# Patient Record
Sex: FEMALE | Race: WHITE | NOT HISPANIC OR LATINO | Employment: UNEMPLOYED | URBAN - METROPOLITAN AREA
[De-identification: names, ages, dates, MRNs, and addresses within clinical notes are randomized per-mention and may not be internally consistent; named-entity substitution may affect disease eponyms.]

---

## 2017-05-02 ENCOUNTER — TRANSCRIBE ORDERS (OUTPATIENT)
Dept: ADMINISTRATIVE | Facility: HOSPITAL | Age: 62
End: 2017-05-02

## 2017-05-02 DIAGNOSIS — E04.1 THYROID NODULE: Primary | ICD-10-CM

## 2017-05-19 ENCOUNTER — HOSPITAL ENCOUNTER (OUTPATIENT)
Dept: RADIOLOGY | Facility: MEDICAL CENTER | Age: 62
Discharge: HOME | End: 2017-05-19
Payer: COMMERCIAL

## 2017-05-19 DIAGNOSIS — E04.1 THYROID NODULE: ICD-10-CM

## 2017-05-19 PROCEDURE — 76536 US EXAM OF HEAD AND NECK: CPT

## 2017-07-27 ENCOUNTER — OFFICE VISIT (OUTPATIENT)
Dept: URGENT CARE | Facility: MEDICAL CENTER | Age: 62
End: 2017-07-27
Payer: COMMERCIAL

## 2017-07-27 PROCEDURE — S9088 SERVICES PROVIDED IN URGENT: HCPCS

## 2017-07-27 PROCEDURE — 99213 OFFICE O/P EST LOW 20 MIN: CPT

## 2017-08-27 ENCOUNTER — OFFICE VISIT (OUTPATIENT)
Dept: URGENT CARE | Facility: MEDICAL CENTER | Age: 62
End: 2017-08-27
Payer: COMMERCIAL

## 2017-08-27 PROCEDURE — S9088 SERVICES PROVIDED IN URGENT: HCPCS

## 2017-08-27 PROCEDURE — 99213 OFFICE O/P EST LOW 20 MIN: CPT

## 2017-10-09 ENCOUNTER — OFFICE VISIT (OUTPATIENT)
Dept: URGENT CARE | Facility: MEDICAL CENTER | Age: 62
End: 2017-10-09
Payer: COMMERCIAL

## 2017-10-09 ENCOUNTER — APPOINTMENT (OUTPATIENT)
Dept: RADIOLOGY | Facility: MEDICAL CENTER | Age: 62
End: 2017-10-09
Payer: COMMERCIAL

## 2017-10-09 DIAGNOSIS — S60.222A CONTUSION OF LEFT HAND: ICD-10-CM

## 2017-10-09 PROCEDURE — 99213 OFFICE O/P EST LOW 20 MIN: CPT

## 2017-10-09 PROCEDURE — S9088 SERVICES PROVIDED IN URGENT: HCPCS

## 2017-10-09 PROCEDURE — 73130 X-RAY EXAM OF HAND: CPT

## 2017-10-12 ENCOUNTER — GENERIC CONVERSION - ENCOUNTER (OUTPATIENT)
Dept: OTHER | Facility: OTHER | Age: 62
End: 2017-10-12

## 2017-10-14 NOTE — PROGRESS NOTES
Assessment  1  Contusion of hand, left (923 20) (S60 222A)   2  Closed displaced fracture of shaft of fifth metacarpal bone of left hand, initial encounter   (815 03) (T17 909Q)    Plan  Closed displaced fracture of shaft of fifth metacarpal bone of left hand, initial encounter    · Ibuprofen 600 MG Oral Tablet; TAKE 1 TABLET 3 TIMES DAILY WITH FOOD AS  NEEDED   · *1 - [facility]  *  Status: Hold For - Scheduling  Requested  for: 70YPI4354  Care Summary provided  : Yes  Contusion of hand, left    · * XR HAND 3+ VIEW LEFT; Status:Active; Requested [facility]:[id number];     Discussion/Summary  Discussion Summary:   Ulnar gutter splint applied in officeas directed ibuprofen as directed for pain up with ortho 1-2  Medication Side Effects Reviewed: Possible side effects of new medications were reviewed with the patient/guardian today  Understands and agrees with treatment plan: The treatment plan was reviewed with the patient/guardian  The patient/guardian understands and agrees with the treatment plan   Counseling Documentation With Imm: The patient was counseled regarding diagnostic results,-- instructions for management,-- risk factor reductions,-- prognosis,-- patient and family education,-- impressions,-- risks and benefits of treatment options,-- importance of compliance with treatment  Follow Up Instructions: Follow Up with your Primary Care Provider in 1-2 days  If your symptoms worsen, go to the nearest [facility] Emergency Department  Chief Complaint  1  Hand Problem  Chief Complaint Free Text Note Form: Patient states she injured L hand while trying to prevent granddaughter from falling      History of Present Illness  HPI: 56 y/o F c/o L hand pain  Pt reports she tried to save her grand daughter from falling down the steps  Pt reprots pain is located 4th digit and radiates down her hand  Pain is made worse with hard grasping   Pt reports pins and needle sensation 4th and 5th digit into her hand  Pt reports using ice and motrin with no relief  Pt is right hand dom  Hospital Based Practices Required Assessment:   Pain Assessment   the patient states they have pain  The pain is located in the L hand  (on a scale of 0 to 10, the patient rates the pain at 9 )   Abuse And Domestic Violence Screen    Yes, the patient is safe at home  -- The patient states no one is hurting them  Depression And Suicide Screen  No, the patient has not had thoughts of hurting themself  No, the patient has not felt depressed in the past 7 days  Prefered Language is  english  Primary Language is  english  Readiness To Learn: Receptive  Barriers To Learning: none  Preferred Learning: verbal      Review of Systems  Focused-Female:   Constitutional: No fever, no chills, feels well, no tiredness, no recent weight gain or loss  ENT: no ear ache, no loss of hearing, no nosebleeds or nasal discharge, no sore throat or hoarseness-- and-- as noted in HPI  Cardiovascular: no complaints of slow or fast heart rate, no chest pain, no palpitations, no leg claudication or lower extremity edema  Respiratory: no complaints of shortness of breath, no wheezing, no dyspnea on exertion, no orthopnea or PND  Breasts: no complaints of breast pain, breast lump or nipple discharge  Gastrointestinal: no complaints of abdominal pain, no constipation, no nausea or diarrhea, no vomiting, no bloody stools  Genitourinary: no complaints of dysuria, no incontinence, no pelvic pain, no dysmenorrhea, no vaginal discharge or abnormal vaginal bleeding  Musculoskeletal: no complaints of arthralgia, no myalgia, no joint swelling or stiffness, no limb pain or swelling  Integumentary: no complaints of skin rash or lesion, no itching or dry skin, no skin wounds  Neurological: no complaints of headache, no confusion, no numbness or tingling, no dizziness or fainting  Active Problems  1   Acute upper respiratory infection (465 9) (J06 9)   2  Hashimoto's disease (245 2) (E06 3)   3  Tremor, physiological (333 1) (R25 1)    Past Medical History  1  History of Acute UTI (599 0) (N39 0)   2  History of contact dermatitis (V13 3) (Z87 2)   3  History of dysuria (V13 00) (C12 145)  Active Problems And Past Medical History Reviewed: The active problems and past medical history were reviewed and updated today  Family History  Family History Reviewed: The family history was reviewed and updated today  Social History   · Never a smoker  Social History Reviewed: The social history was reviewed and updated today  Surgical History  Surgical History Reviewed: The surgical history was reviewed and updated today  Current Meds   1  No Reported Medications Recorded  Medication List Reviewed: The medication list was reviewed and updated today  Allergies  1  No Known Drug Allergies    Vitals  Signs   Recorded: 15JOV3648 08:37AM   Temperature: 98 2 F, Temporal  Heart Rate: 88  Respiration: 18  Systolic: 212  Diastolic: 78  Weight: 383 lb   O2 Saturation: 100  Pain Scale: 9    Physical Exam    Pulmonary   Respiratory effort: No increased work of breathing or signs of respiratory distress  Auscultation of lungs: Clear to auscultation  Cardiovascular   Auscultation of heart: Normal rate and rhythm, normal S1 and S2, without murmurs  Musculoskeletal   Inspection/palpation of joints, bones, and muscles: Abnormal  -- Left hand: FROM pain with flexion/extension, TTP 4th and 5th metacarpal, with edema and ecchymosis, - snuff box tenderness, +2 caprefill, NVI        Results/Data  Diagnostic Studies Reviewed:   X-ray Review Left hand: fx of 5th metacarpal       Future Appointments    Date/Time Provider Specialty Site   10/12/2017 10:20 AM [facility] [address]     Signatures   Electronically signed by : Jonna Bumpers, [facility]; Oct  9 2017  9:26AM EST (Author)    Electronically signed by : ROLY Anthony ; Oct 13 2017  9:37AM EST                       (Co-author)

## 2017-11-06 ENCOUNTER — APPOINTMENT (OUTPATIENT)
Dept: RADIOLOGY | Facility: MEDICAL CENTER | Age: 62
End: 2017-11-06
Payer: COMMERCIAL

## 2017-11-06 ENCOUNTER — GENERIC CONVERSION - ENCOUNTER (OUTPATIENT)
Dept: OTHER | Facility: OTHER | Age: 62
End: 2017-11-06

## 2017-11-06 DIAGNOSIS — S60.222A CONTUSION OF LEFT HAND: ICD-10-CM

## 2017-11-06 PROCEDURE — 73130 X-RAY EXAM OF HAND: CPT

## 2017-11-20 ENCOUNTER — APPOINTMENT (OUTPATIENT)
Dept: RADIOLOGY | Facility: MEDICAL CENTER | Age: 62
End: 2017-11-20
Payer: COMMERCIAL

## 2017-11-20 ENCOUNTER — GENERIC CONVERSION - ENCOUNTER (OUTPATIENT)
Dept: OTHER | Facility: OTHER | Age: 62
End: 2017-11-20

## 2017-11-20 DIAGNOSIS — S62.307G: ICD-10-CM

## 2017-11-20 DIAGNOSIS — S62.327A: ICD-10-CM

## 2017-11-20 PROCEDURE — 73130 X-RAY EXAM OF HAND: CPT

## 2017-12-01 ENCOUNTER — HOSPITAL ENCOUNTER (OUTPATIENT)
Dept: RADIOLOGY | Facility: HOSPITAL | Age: 62
Discharge: HOME | End: 2017-12-01
Attending: ORTHOPAEDIC SURGERY
Payer: COMMERCIAL

## 2017-12-01 ENCOUNTER — ALLSCRIPTS OFFICE VISIT (OUTPATIENT)
Dept: OTHER | Facility: OTHER | Age: 62
End: 2017-12-01

## 2017-12-01 DIAGNOSIS — S62.327A: ICD-10-CM

## 2017-12-01 PROCEDURE — 73130 X-RAY EXAM OF HAND: CPT

## 2017-12-05 NOTE — PROGRESS NOTES
Assessment    1  Closed displaced fracture of shaft of fifth metacarpal bone of left hand, initial encounter (815 03) (U72 978A)    Plan  Closed displaced fracture of shaft of fifth metacarpal bone of left hand, initial encounter    · * XR HAND 3+ VIEW LEFT; Status:Active - Retrospective By Protocol Authorization; Requested for:[id number];    · Follow-up visit in 6 weeks Evaluation and Treatment  Follow-up  Status: Hold For -Scheduling  Requested for: 45MEY6486   · Continue with our present treatment plan ; Status:Complete;   Done: 15DIJ1654   · *1 - [facility] Ref[facility]-Management  *wrist, hand, and finger ROM  Status:Active  Requested for: 75XCY4331  Care Summary provided  : Yes    Discussion/Summary    Assessment: healed fx of 5th metacarpal bone of left hand  therapy to work on wrist, hand and finger ROMcan stop use of the splint, no heavy lifting  of calcium a daywk f/u for another xray  note was dictated with dragon dictation software  As such, and may contain typographical errors, improperly dictated words, background noise, or other errors  patient presents today as a referral from [facility] for a close displaced fracture of fifth metacarpal bone of L hand with delayed healing  Patient injured this hand on Oct 9, 2017 (8 weeks ago)  Pt is no longer in pain  Pt was in a fiberglass cast for 8 weeks and then was moved into a removable splint  Pt continues to still have stiffness over the volar aspect of the base of the 5th metacarpal   past medical history, surgical history, family history, social history, medications, allergies, and review of systems have been read and reviewed on the chart and have been updated  Review of Systems was recorded in the office today on a separate evaluation sheet and is listed below    of Systems:NegativeNegativeNegativeNegativeNegativeNegativeNegative except as aboveAs aboveNegative except as aboveNegativeNegative    / Psych: Awake and Oriented, No acute distress, age appropriateNormocephalic, atraumatic, mucous membranes moist, neck supple, trachea midline  No rebound or signs of guardingNo discernible arrhythmia, 2+ pulses with good capillary refillNo audible wheezing or audible stridor[The patient is neurovascularly intact in the median, ulnar, and radial nerve distribution  There is normal sensation and good capillary refill within the digits  2+ pulses  ][No lesions, rashes, streaking, purulence, abscesses, lymphangitis]mild swelling noted over the volar side of her fifth metacarpal  L wrist extension 20 degrees, L wrist flexion 20, composite fist 2cm of flexor crease  Studies: repeat Xrays were done today in the office  They were reviewed by Dr Lucillie Cabot and discussed with the patient  Xray reveals       Chief Complaint  1  Hand Problem    Active Problems    1  Acute upper respiratory infection (465 9) (J06 9)   2  Closed displaced fracture of fifth metacarpal bone of left hand with delayed healing, unspecified portion of metacarpal, subsequent encounter (V54 19) (S62 307G)   3  Closed displaced fracture of shaft of fifth metacarpal bone of left hand, initial encounter (815 03) (S62 327A)   4  Contusion of hand, left (923 20) (S60 222A)   5  Hashimoto's disease (245 2) (E06 3)   6  Tremor, physiological (333 1) (R25 1)    Past Medical History   · History of Acute UTI (599 0) (N39 0)   · History of contact dermatitis (V13 3) (Z87 2)   · History of dysuria (V13 00) (E90 307)    Family History   · Family history of malignant neoplasm (V16 9) (Z80 9)    Social History     · Never a smoker    Current Meds   1  Ibuprofen 600 MG Oral Tablet; TAKE 1 TABLET 3 TIMES DAILY WITH FOOD AS NEEDED; Therapy: 52PHL2051 to (Evaluate:16Oct2017)  Requested for: 49MTG2639; Last Rx:09Oct2017 Ordered    Allergies  1   No Known Drug Allergies    Vitals  Signs     Heart Rate: 791  Systolic: 507  Diastolic: 85  Height: 5 ft 8 in  Weight: 185 lb 4 oz  BMI Calculated: 28 17  BSA Calculated: 1 98    Signatures   Electronically signed by : OFELIA Hopkins ; Dec  1 2017  2:13PM EST                       (Author)

## 2018-01-22 VITALS
BODY MASS INDEX: 28.19 KG/M2 | HEART RATE: 79 BPM | SYSTOLIC BLOOD PRESSURE: 159 MMHG | HEIGHT: 68 IN | WEIGHT: 186 LBS | DIASTOLIC BLOOD PRESSURE: 88 MMHG

## 2018-01-22 VITALS
BODY MASS INDEX: 28.08 KG/M2 | WEIGHT: 185.25 LBS | HEART RATE: 103 BPM | HEIGHT: 68 IN | DIASTOLIC BLOOD PRESSURE: 85 MMHG | SYSTOLIC BLOOD PRESSURE: 168 MMHG

## 2018-01-22 VITALS
DIASTOLIC BLOOD PRESSURE: 82 MMHG | SYSTOLIC BLOOD PRESSURE: 129 MMHG | HEIGHT: 68 IN | BODY MASS INDEX: 28.19 KG/M2 | WEIGHT: 186 LBS | HEART RATE: 85 BPM

## 2018-01-22 VITALS
BODY MASS INDEX: 28.19 KG/M2 | WEIGHT: 186 LBS | HEART RATE: 80 BPM | SYSTOLIC BLOOD PRESSURE: 138 MMHG | HEIGHT: 68 IN | DIASTOLIC BLOOD PRESSURE: 83 MMHG

## 2018-01-23 NOTE — CONSULTS
Chief Complaint    1  Hand Problem    Active Problems    1  Acute upper respiratory infection (465 9) (J06 9)   2  Closed displaced fracture of fifth metacarpal bone of left hand with delayed healing,   unspecified portion of metacarpal, subsequent encounter (V54 19) (S62 307G)   3  Closed displaced fracture of shaft of fifth metacarpal bone of left hand, initial encounter   (815 03) (S62 327A)   4  Contusion of hand, left (923 20) (S60 222A)   5  Hashimoto's disease (245 2) (E06 3)   6  Tremor, physiological (333 1) (R25 1)    Past Medical History    1  History of Acute UTI (599 0) (N39 0)   2  History of contact dermatitis (V13 3) (Z87 2)   3  History of dysuria (V13 00) (D78 664)    Family History    1  Family history of malignant neoplasm (V16 9) (Z80 9)    Social History    · Never a smoker    Current Meds   1  Ibuprofen 600 MG Oral Tablet; TAKE 1 TABLET 3 TIMES DAILY WITH FOOD AS NEEDED; Therapy: 23EPF4727 to (Evaluate:16Oct2017)  Requested for: 12DJR5040; Last   Rx:09Oct2017 Ordered    Allergies    1  No Known Drug Allergies    Vitals  Signs   Recorded: 67TEF6597 12:11PM   Heart Rate: 360  Systolic: 256  Diastolic: 85  Height: 5 ft 8 in  Weight: 185 lb 4 oz  BMI Calculated: 28 17  BSA Calculated: 1 98    Assessment    1  Closed displaced fracture of shaft of fifth metacarpal bone of left hand, initial encounter   (815 03) (Z16 529X)    Plan  Closed displaced fracture of shaft of fifth metacarpal bone of left hand, initial encounter    1  * XR HAND 3+ VIEW LEFT; Status:Active - Retrospective By Protocol Authorization; Requested for:[id number];    2  Follow-up visit in 6 weeks Evaluation and Treatment  Follow-up  Status: Hold For -   Scheduling  Requested for: 42PKY4196   3  Continue with our present treatment plan ; Status:Complete;   Done: 36AWT0973   4  *1 - [facility] Ref[facility] Co-Management  *wrist, hand, and finger ROM  Status:   Active  Requested for: 06HGF3470  Care Summary provided   Jimi Beckford Yes    Discussion/Summary  Discussion Summary:   Assessment: healed fx of 5th metacarpal bone of left hand  Plan:   Physical therapy to work on wrist, hand and finger ROM  pt can stop use of the splint, no heavy lifting  2000mg of calcium a day  6 wk f/u for another xray  This note was dictated with dragon dictation software  As such, and may contain typographical errors, improperly dictated words, background noise, or other errors  HPI:  The patient presents today as a referral from [facility] for a close displaced fracture of fifth metacarpal bone of L hand with delayed healing  Patient injured this hand on Oct 9, 2017 (8 weeks ago)  Pt is no longer in pain  Pt was in a fiberglass cast for 8 weeks and then was moved into a removable splint  Pt continues to still have stiffness over the volar aspect of the base of the 5th metacarpal      The past medical history, surgical history, family history, social history, medications, allergies, and review of systems have been read and reviewed on the chart and have been updated  Review of Systems was recorded in the office today on a separate evaluation sheet and is listed below  Review of Systems:  Constitutional: Negative  HEENT: Negative  Cardiovascular: Negative  Pulmonary: Negative  : Negative  GI: Negative  Skin: Negative except as above  Musculoskeletal: As above  Neurologic: Negative except as above  Endocrine: Negative  Psychiatric: Negative    Examination:    General / Psych: Awake and Oriented, No acute distress, age appropriate  HEENT: Normocephalic, atraumatic, mucous membranes moist, neck supple, trachea midline  Abdomen: No rebound or signs of guarding  Cardio: No discernible arrhythmia, 2+ pulses with good capillary refill  Pulmonary: No audible wheezing or audible stridor  Neurologic: [The patient is neurovascularly intact in the median, ulnar, and radial nerve distribution  There is normal sensation and good capillary refill within the digits  2+ pulses ]  Skin: [No lesions, rashes, streaking, purulence, abscesses, lymphangitis]  Musculoskeletal: mild swelling noted over the volar side of her fifth metacarpal  L wrist extension 20 degrees, L wrist flexion 20, composite fist 2cm of flexor crease  Diagnostic Studies: repeat Xrays were done today in the office  They were reviewed by Dr Lelia Albert and discussed with the patient   Xray reveals          Signatures   Electronically signed by : OFELIA Hopkins ; Dec  1 2017  2:13PM EST                       (Author)

## 2018-01-23 NOTE — CONSULTS
Chief Complaint    1  Hand Problem    Active Problems    1  Acute upper respiratory infection (465 9) (J06 9)   2  Closed displaced fracture of fifth metacarpal bone of left hand with delayed healing,   unspecified portion of metacarpal, subsequent encounter (V54 19) (S62 307G)   3  Closed displaced fracture of shaft of fifth metacarpal bone of left hand, initial encounter   (815 03) (S62 327A)   4  Contusion of hand, left (923 20) (S60 222A)   5  Hashimoto's disease (245 2) (E06 3)   6  Tremor, physiological (333 1) (R25 1)    Past Medical History    · History of Acute UTI (599 0) (N39 0)   · History of contact dermatitis (V13 3) (Z87 2)   · History of dysuria (V13 00) (Q70 739)    Family History    · Family history of malignant neoplasm (V16 9) (Z80 9)    Social History    · Never a smoker    Current Meds   1  Ibuprofen 600 MG Oral Tablet; TAKE 1 TABLET 3 TIMES DAILY WITH FOOD AS NEEDED; Therapy: 78PMT3250 to (Evaluate:16Oct2017)  Requested for: 90AEB7457; Last   Rx:09Oct2017 Ordered    Allergies    1  No Known Drug Allergies    Vitals  Signs    Heart Rate: 959GHKAWJVR: 230PPMDBSJXZ: 37UASRMA: 5 ft 8 inWeight: 185 lb 4 ozBMI Calculated: 28 17BSA Calculated: 1 98    Assessment    1  Closed displaced fracture of shaft of fifth metacarpal bone of left hand, initial encounter   (815 03) (K68 447A)    Plan     Closed displaced fracture of shaft of fifth metacarpal bone of left hand, initial encounter    · * XR HAND 3+ VIEW LEFT; Status:Active - Retrospective By Protocol Authorization;   Requested for:17Icn2656;    · Follow-up visit in 6 weeks Evaluation and Treatment  Follow-up  Status: Hold For -  Scheduling  Requested for: 26VSS7929   · Continue with our present treatment plan ; Status:Complete;   Done: 94EHV8974   · *1 - [facility] Ref[facility]-Management  *wrist, hand, and finger ROM  Status:  Active  Requested for: 12LXZ9558  Care Summary provided  : Yes    Discussion/Summary    Assessment: healed fx of 5th metacarpal bone of left hand  Plan:   Physical therapy to work on wrist, hand and finger ROM  pt can stop use of the splint, no heavy lifting  2000mg of calcium a day  6 wk f/u for another xray  This note was dictated with dragon dictation software  As such, and may contain typographical errors, improperly dictated words, background noise, or other errors  HPI:  The patient presents today as a referral from [facility] for a close displaced fracture of fifth metacarpal bone of L hand with delayed healing  Patient injured this hand on Oct 9, 2017 (8 weeks ago)  Pt is no longer in pain  Pt was in a fiberglass cast for 8 weeks and then was moved into a removable splint  Pt continues to still have stiffness over the volar aspect of the base of the 5th metacarpal      The past medical history, surgical history, family history, social history, medications, allergies, and review of systems have been read and reviewed on the chart and have been updated  Review of Systems was recorded in the office today on a separate evaluation sheet and is listed below  Review of Systems:  Constitutional: Negative  HEENT: Negative  Cardiovascular: Negative  Pulmonary: Negative  : Negative  GI: Negative  Skin: Negative except as above  Musculoskeletal: As above  Neurologic: Negative except as above  Endocrine: Negative  Psychiatric: Negative    Examination:    General / Psych: Awake and Oriented, No acute distress, age appropriate  HEENT: Normocephalic, atraumatic, mucous membranes moist, neck supple, trachea midline  Abdomen: No rebound or signs of guarding  Cardio: No discernible arrhythmia, 2+ pulses with good capillary refill  Pulmonary: No audible wheezing or audible stridor  Neurologic: [The patient is neurovascularly intact in the median, ulnar, and radial nerve distribution  There is normal sensation and good capillary refill within the digits   2+ pulses ]  Skin: [No lesions, rashes, streaking, purulence, abscesses, lymphangitis]  Musculoskeletal: mild swelling noted over the volar side of her fifth metacarpal  L wrist extension 20 degrees, L wrist flexion 20, composite fist 2cm of flexor crease  Diagnostic Studies: repeat Xrays were done today in the office  They were reviewed by Dr Shanice Lopez and discussed with the patient   Xray reveals          Signatures   Electronically signed by : OFELIA Tijerina ; Dec  1 2017  2:13PM EST                       (Author)

## 2018-03-11 ENCOUNTER — OFFICE VISIT (OUTPATIENT)
Dept: URGENT CARE | Facility: MEDICAL CENTER | Age: 63
End: 2018-03-11
Payer: COMMERCIAL

## 2018-03-11 VITALS
BODY MASS INDEX: 28.13 KG/M2 | WEIGHT: 185 LBS | DIASTOLIC BLOOD PRESSURE: 82 MMHG | OXYGEN SATURATION: 95 % | SYSTOLIC BLOOD PRESSURE: 145 MMHG | HEART RATE: 107 BPM | RESPIRATION RATE: 18 BRPM | TEMPERATURE: 98.4 F

## 2018-03-11 DIAGNOSIS — L23.7 ALLERGIC CONTACT DERMATITIS DUE TO PLANTS, EXCEPT FOOD: ICD-10-CM

## 2018-03-11 DIAGNOSIS — R30.0 DYSURIA: Primary | ICD-10-CM

## 2018-03-11 PROCEDURE — 99213 OFFICE O/P EST LOW 20 MIN: CPT

## 2018-03-11 PROCEDURE — S9088 SERVICES PROVIDED IN URGENT: HCPCS

## 2018-03-11 PROCEDURE — 87086 URINE CULTURE/COLONY COUNT: CPT

## 2018-03-11 RX ORDER — SULFAMETHOXAZOLE AND TRIMETHOPRIM 800; 160 MG/1; MG/1
1 TABLET ORAL EVERY 12 HOURS SCHEDULED
Qty: 6 TABLET | Refills: 0 | Status: SHIPPED | OUTPATIENT
Start: 2018-03-11 | End: 2018-03-14

## 2018-03-11 RX ORDER — TRIAMCINOLONE ACETONIDE 1 MG/G
CREAM TOPICAL 2 TIMES DAILY
Qty: 30 G | Refills: 0 | Status: SHIPPED | OUTPATIENT
Start: 2018-03-11

## 2018-03-11 NOTE — PROGRESS NOTES
[email or web address]        NAME: Fili Cassidy is a 57 y o  female  : 1955    MRN: [id number]  DATE: 2018  TIME: 9:37 AM    Assessment and Plan   Dysuria [R30 0]  1  Dysuria  POCT urine dip    Urine culture   2  Allergic contact dermatitis due to plants, except food  triamcinolone (KENALOG) 0 1 % cream    sulfamethoxazole-trimethoprim (BACTRIM DS) 800-160 mg per tablet         Patient Instructions       Patient Instructions   Take antibiotic as directed  Eat yogurt to avoid GI upset  Increase fluid intake  Avoid holding bladder for prolonged periods  Urinate after intercourse  Avoid wearing a sweater  Use steroid cream as directed  Follow up with PCP in 1-2 days  Go to the ER for worsening symptoms  Dysuria   WHAT YOU NEED TO KNOW:   Dysuria is difficulty urinating, or pain, burning, or discomfort with urination  Dysuria is usually a symptom of another problem  DISCHARGE INSTRUCTIONS:   Return to the emergency department if:   · You have severe back, side, or abdominal pain  · You have fever and shaking chills  · You vomit several times in a row  Contact your healthcare provider if:   · Your symptoms do not go away, even after treatment  · You have questions or concerns about your condition or care  Medicines:   · Medicines  may be given to help treat a bacterial infection or help decrease bladder spasms  · Take your medicine as directed  Contact your healthcare provider if you think your medicine is not helping or if you have side effects  Tell him of her if you are allergic to any medicine  Keep a list of the medicines, vitamins, and herbs you take  Include the amounts, and when and why you take them  Bring the list or the pill bottles to follow-up visits  Carry your medicine list with you in case of an emergency  Follow up with your healthcare provider as directed:   Your healthcare provider may also refer you to a urologist or nephrologist to have additional testing  Write down your questions so you remember to ask them during your visits  Manage your dysuria:   · Drink more liquids  Liquids help flush out bacteria that may be causing an infection  Ask your healthcare provider how much liquid to drink each day and which liquids are best for you  · Take sitz baths as directed  Fill a bathtub with 4 to 6 inches of warm water  You may also use a sitz bath pan that fits over a toilet  Sit in the sitz bath for 20 minutes  Do this 2 to 3 times a day, or as directed  The warm water can help decrease pain and swelling  © 2017 [address]  Information is for End User's use only and may not be sold, redistributed or otherwise used for commercial purposes  All illustrations and images included in CareNotes® are the copyrighted property of A D A M [employer] Inc  or [address]  The above information is an  only  It is not intended as medical advice for individual conditions or treatments  Talk to your doctor, nurse or pharmacist before following any medical regimen to see if it is safe and effective for you  Chief Complaint     Chief Complaint   Patient presents with    Difficulty Urinating     times one day     Rash     on shoulders, thinks its sunburn         History of Present Illness       Pt reports rash on b/l shoulders  Pt reports she did get new sweaters and unsure what it is from  Pt denies any changes in soaps detergents  Pt denies any throat, tongue or lip swelling  Pt reports she did come back from a cruise on Friday  Pt reports similar rash from the last time she went on a cruise  Pt reports she was unsure if it was sunburn  Urinary Tract Infection    This is a new problem  The current episode started yesterday  The problem has been unchanged  The pain is at a severity of 6/10  The pain is moderate  There has been no fever  Associated symptoms include frequency and urgency   Pertinent negatives include no chills, discharge, flank pain, hematuria, hesitancy, nausea, possible pregnancy, sweats or vomiting  Associated symptoms comments: Abdominal pressure supra pubic region      There is no history of catheterization, kidney stones, recurrent UTIs, a single kidney, urinary stasis or a urological procedure  Review of Systems   Review of Systems   Constitutional: Negative for chills, fatigue and fever  HENT: Negative for congestion, ear pain, hearing loss, postnasal drip, sinus pain, sinus pressure and sore throat  Eyes: Negative for pain and discharge  Respiratory: Negative for chest tightness and shortness of breath  Cardiovascular: Negative for chest pain  Gastrointestinal: Negative for abdominal pain, constipation, nausea and vomiting  Genitourinary: Positive for frequency and urgency  Negative for difficulty urinating, flank pain, hematuria and hesitancy  Musculoskeletal: Negative for arthralgias and myalgias  Skin: Negative for rash  Neurological: Negative for dizziness and headaches  Psychiatric/Behavioral: Negative for behavioral problems           Current Medications       Current Outpatient Prescriptions:     sulfamethoxazole-trimethoprim (BACTRIM DS) 800-160 mg per tablet, Take 1 tablet by mouth every 12 (twelve) hours for 3 days, Disp: 6 tablet, Rfl: 0    triamcinolone (KENALOG) 0 1 % cream, Apply topically 2 (two) times a day, Disp: 30 g, Rfl: 0    Current Allergies     Allergies as of 03/11/2018 - Reviewed 03/11/2018   Allergen Reaction Noted    Bee venom Anaphylaxis 03/11/2018    Influenza vaccines Rash 03/11/2018            The following portions of the patient's history were reviewed and updated as appropriate: allergies, current medications, past family history, past medical history, past social history, past surgical history and problem list      Past Medical History:   Diagnosis Date    Known health problems: none        Past Surgical History:   Procedure Laterality Date  NO PAST SURGERIES         No family history on file  Medications have been verified  Objective   /82   Pulse (!) 107   Temp 98 4 °F (36 9 °C) (Oral)   Resp 18   Wt 83 9 kg (185 lb)   SpO2 95%   BMI 28 13 kg/m²        Physical Exam     Physical Exam   Constitutional: She is oriented to person, place, and time  She appears well-developed and well-nourished  Cardiovascular: Normal rate, regular rhythm and normal heart sounds  No murmur heard  Pulmonary/Chest: Effort normal and breath sounds normal  No respiratory distress  Abdominal: Soft  Normal appearance and bowel sounds are normal  She exhibits no distension  There is tenderness in the suprapubic area  There is no rigidity, no rebound, no guarding, no CVA tenderness, no tenderness at McBurney's point and negative Bueno's sign  Neurological: She is alert and oriented to person, place, and time  Skin: Rash noted  Rash is vesicular  B/l erythematous vesicular rash on anterior aspect of shoulders  Psychiatric: She has a normal mood and affect  Nursing note and vitals reviewed

## 2018-03-11 NOTE — PATIENT INSTRUCTIONS
Take antibiotic as directed  Eat yogurt to avoid GI upset  Increase fluid intake  Avoid holding bladder for prolonged periods  Urinate after intercourse  Avoid wearing a sweater  Use steroid cream as directed  Follow up with PCP in 1-2 days  Go to the ER for worsening symptoms  Dysuria   WHAT YOU NEED TO KNOW:   Dysuria is difficulty urinating, or pain, burning, or discomfort with urination  Dysuria is usually a symptom of another problem  DISCHARGE INSTRUCTIONS:   Return to the emergency department if:   · You have severe back, side, or abdominal pain  · You have fever and shaking chills  · You vomit several times in a row  Contact your healthcare provider if:   · Your symptoms do not go away, even after treatment  · You have questions or concerns about your condition or care  Medicines:   · Medicines  may be given to help treat a bacterial infection or help decrease bladder spasms  · Take your medicine as directed  Contact your healthcare provider if you think your medicine is not helping or if you have side effects  Tell him of her if you are allergic to any medicine  Keep a list of the medicines, vitamins, and herbs you take  Include the amounts, and when and why you take them  Bring the list or the pill bottles to follow-up visits  Carry your medicine list with you in case of an emergency  Follow up with your healthcare provider as directed: Your healthcare provider may also refer you to a urologist or nephrologist to have additional testing  Write down your questions so you remember to ask them during your visits  Manage your dysuria:   · Drink more liquids  Liquids help flush out bacteria that may be causing an infection  Ask your healthcare provider how much liquid to drink each day and which liquids are best for you  · Take sitz baths as directed  Fill a bathtub with 4 to 6 inches of warm water  You may also use a sitz bath pan that fits over a toilet   Sit in the sitz bath for 20 minutes  Do this 2 to 3 times a day, or as directed  The warm water can help decrease pain and swelling  © 2017 [address] Information is for End User's use only and may not be sold, redistributed or otherwise used for commercial purposes  All illustrations and images included in CareNotes® are the copyrighted property of [employer]  or [facility]  The above information is an  only  It is not intended as medical advice for individual conditions or treatments  Talk to your doctor, nurse or pharmacist before following any medical regimen to see if it is safe and effective for you

## 2018-03-12 LAB — BACTERIA UR CULT: NORMAL

## 2018-08-28 ENCOUNTER — OFFICE VISIT (OUTPATIENT)
Dept: URGENT CARE | Facility: MEDICAL CENTER | Age: 63
End: 2018-08-28
Payer: COMMERCIAL

## 2018-08-28 VITALS
WEIGHT: 188.6 LBS | HEART RATE: 90 BPM | SYSTOLIC BLOOD PRESSURE: 154 MMHG | TEMPERATURE: 98.9 F | HEIGHT: 68 IN | BODY MASS INDEX: 28.58 KG/M2 | OXYGEN SATURATION: 99 % | DIASTOLIC BLOOD PRESSURE: 85 MMHG

## 2018-08-28 DIAGNOSIS — L23.7 POISON IVY DERMATITIS: Primary | ICD-10-CM

## 2018-08-28 PROCEDURE — S9088 SERVICES PROVIDED IN URGENT: HCPCS

## 2018-08-28 PROCEDURE — 99203 OFFICE O/P NEW LOW 30 MIN: CPT

## 2018-08-28 RX ORDER — CEPHALEXIN 500 MG/1
500 CAPSULE ORAL EVERY 6 HOURS SCHEDULED
Status: DISCONTINUED | OUTPATIENT
Start: 2018-08-28 | End: 2018-08-28

## 2018-08-28 RX ORDER — PREDNISONE 20 MG/1
40 TABLET ORAL DAILY
Qty: 10 TABLET | Refills: 0 | Status: SHIPPED | OUTPATIENT
Start: 2018-08-28 | End: 2018-09-02

## 2018-08-28 RX ORDER — CEPHALEXIN 500 MG/1
500 CAPSULE ORAL EVERY 6 HOURS SCHEDULED
Qty: 28 CAPSULE | Refills: 0 | Status: SHIPPED | OUTPATIENT
Start: 2018-08-28 | End: 2018-09-04

## 2018-08-28 NOTE — PROGRESS NOTES
[facility]        NAME: Robson Vaughn is a 70 y o  female  : 1955    MRN: [id number]  DATE: 2018  TIME: 7:37 PM    Assessment and Plan   Poison ivy dermatitis [L23 7]  1  Poison ivy dermatitis  predniSONE 20 mg tablet    cephalexin (KEFLEX) capsule 500 mg         Patient Instructions      poison ivy dermatitis  Prednisone 40mg daily x 5 days  If redness starts spreading start keflex  Follow up with PCP in 3-5 days  Proceed to  ER if symptoms worsen  Chief Complaint     Chief Complaint   Patient presents with   [facility]     Poison ivy on right arm and legs x 2 days  History of Present Illness       Patient presents with c/o rash to upper extremities after working on the garden  States the rash is not going away and now has a little redness around the rash        Review of Systems   Review of Systems   Constitutional: Negative  Respiratory: Negative  Cardiovascular: Negative  Skin: Positive for rash           Current Medications       Current Outpatient Prescriptions:     predniSONE 20 mg tablet, Take 2 tablets (40 mg total) by mouth daily for 5 days, Disp: 10 tablet, Rfl: 0    triamcinolone (KENALOG) 0 1 % cream, Apply topically 2 (two) times a day (Patient not taking: Reported on 2018 ), Disp: 30 g, Rfl: 0    Current Facility-Administered Medications:     cephalexin (KEFLEX) capsule 500 mg, 500 mg, Oral, [address], Juan Preciado PA-C    Current Allergies     Allergies as of 2018 - Reviewed 2018   Allergen Reaction Noted    Bee venom Anaphylaxis 2018    Influenza vaccines Rash 2018            The following portions of the patient's history were reviewed and updated as appropriate: allergies, current medications, past family history, past medical history, past social history, past surgical history and problem list      Past Medical History:   Diagnosis Date    Known health problems: none        Past Surgical History:   Procedure Laterality Date    NO PAST SURGERIES         No family history on file  Medications have been verified  Objective   /85   Pulse 90   Temp 98 9 °F (37 2 °C) (Temporal)   Ht 5' 8" (1 727 m)   Wt 85 5 kg (188 lb 9 6 oz)   SpO2 99%   BMI 28 68 kg/m²        Physical Exam     Physical Exam   Constitutional: She appears well-developed and well-nourished  No distress  HENT:   Head: Normocephalic and atraumatic  Neck: Normal range of motion  Neck supple  Cardiovascular: Normal rate, regular rhythm, normal heart sounds and intact distal pulses  Pulmonary/Chest: Effort normal and breath sounds normal    Lymphadenopathy:     She has no cervical adenopathy  Skin: She is not diaphoretic

## 2018-08-28 NOTE — PATIENT INSTRUCTIONS
poison ivy dermatitis  Prednisone 40mg daily x 5 days  If redness starts spreading start keflex  Follow up with PCP in 3-5 days  Proceed to  ER if symptoms worsen  Poison Ivy   WHAT YOU NEED TO KNOW:   Poison ivy is a plant that can cause an itchy, uncomfortable rash on your skin  Poison ivy grows as a shrub or vine in woods, fields, and areas of thick [place]  It has 3 bright green leaves on each stem that turn red in essie  DISCHARGE INSTRUCTIONS:   Medicines:   · Antiseptic or drying creams or ointments: These medicines may be used to dry out the rash and decrease the itching  These products may be available without a doctor's order  · Steroids: This medicine helps decrease itching and inflammation  It can be given as a cream to apply to your skin or as a pill  · Antihistamines: This medicine may help decrease itching and help you sleep  It is available without a doctor's order  · Take your medicine as directed  Contact your healthcare provider if you think your medicine is not helping or if you have side effects  Tell him of her if you are allergic to any medicine  Keep a list of the medicines, vitamins, and herbs you take  Include the amounts, and when and why you take them  Bring the list or the pill bottles to follow-up visits  Carry your medicine list with you in case of an emergency  Follow up with your healthcare provider as directed:  Write down your questions so you remember to ask them during your visits  How your poison ivy rash spreads: You cannot spread poison ivy by touching your rash or the liquid from your blisters  Poison ivy is spread only if you scratch your skin while it still has oil on it  You may think your rash is spreading because new rashes appear over a number of days  This happens because areas covered by thin skin break out in a rash first  Your face or forearms may develop a rash before thicker areas, such as the palms of your hands     Self-care: · Keep your rash clean and dry:  Wash it with soap and water  Gently pat it dry with a clean towel  · Try not to scratch or rub your rash: This can cause your skin to become infected  · Use a compress on your rash:  Dip a clean washcloth in cool water  Wring it out and place it on your rash  Leave the washcloth on your skin for 15 minutes  Do this at least 3 times per day  · Take a cornstarch or oatmeal bath: If your rash is too large to cover with wet washcloths, take 3 or 4 cornstarch baths daily  Mix 1 pound of cornstarch with a little water to make a paste  Add the paste to a tub full of water and mix well  You may also use colloidal oatmeal in the bath water  Use lukewarm water  Avoid hot water because it may cause your itching to increase  Prevent a poison ivy rash in the future:   · Wear skin protection:  Wear long pants, a long-sleeved shirt, and gloves  Use a skin block lotion to protect your skin from poison ivy oil  You can find this at a drugstore without a prescription  · Wash clothing after possible exposure: If you think you have been near a poison ivy plant, wash the clothes you were wearing separately from other clothes  Rinse the washing machine well after you take the clothes out  Scrub boots and shoes with warm, soapy water  Dry clean items and clothing that you cannot wash in water  Poison ivy oil is sticky and can stay on surfaces for a long time  It can cause a new rash even years later  · Bathe your pet:  Use warm water and shampoo on your pet's fur  This will prevent the spread of oil to your skin, car, and home  Wear long sleeves, long pants, and gloves while washing pets or any items that may have oil on them  · Reduce exposure to poison ivy:  Do not touch plants that look like poison ivy  Keep your yard free of poison ivy  While protecting your skin, remove the plant and the roots  Place them in a plastic bag and seal the bag tightly       · Do not burn poison ivy plants: This can spread the oil through the air  If you breathe the oil into your lungs, you could have swelling and serious breathing problems  Oil that clings to the fire danae can land on your skin and cause a rash  Contact your healthcare provider if:   · You have pus, soft yellow scabs, or tenderness on the rash  · The itching gets worse or keeps you awake at night  · The rash covers more than 1/4 of your skin or spreads to your eyes, mouth, or genital area  · The rash is not better after 2 to 3 weeks  · You have tender, swollen glands on the sides of your neck  · You have swelling in your arms and legs  · You have questions or concerns about your condition or care  Return to the emergency department if:   · You have a fever  · You have redness, swelling, and tenderness around the rash  · You have trouble breathing  © 2017 [address] Information is for End User's use only and may not be sold, redistributed or otherwise used for commercial purposes  All illustrations and images included in CareNotes® are the copyrighted property of [employer]  or [employer]  The above information is an  only  It is not intended as medical advice for individual conditions or treatments  Talk to your doctor, nurse or pharmacist before following any medical regimen to see if it is safe and effective for you

## 2019-05-10 ENCOUNTER — HOSPITAL ENCOUNTER (OUTPATIENT)
Dept: RADIOLOGY | Facility: MEDICAL CENTER | Age: 64
Discharge: HOME | End: 2019-05-10
Payer: COMMERCIAL

## 2019-05-10 DIAGNOSIS — E03.9 PRIMARY HYPOTHYROIDISM: ICD-10-CM

## 2019-05-10 PROCEDURE — 76536 US EXAM OF HEAD AND NECK: CPT

## 2019-09-22 ENCOUNTER — OFFICE VISIT (OUTPATIENT)
Dept: URGENT CARE | Facility: MEDICAL CENTER | Age: 64
End: 2019-09-22
Payer: COMMERCIAL

## 2019-09-22 VITALS
RESPIRATION RATE: 18 BRPM | HEART RATE: 80 BPM | OXYGEN SATURATION: 98 % | HEIGHT: 68 IN | SYSTOLIC BLOOD PRESSURE: 148 MMHG | BODY MASS INDEX: 28.73 KG/M2 | WEIGHT: 189.6 LBS | DIASTOLIC BLOOD PRESSURE: 86 MMHG | TEMPERATURE: 99.9 F

## 2019-09-22 DIAGNOSIS — R30.0 DYSURIA: Primary | ICD-10-CM

## 2019-09-22 LAB
SL AMB  POCT GLUCOSE, UA: ABNORMAL
SL AMB LEUKOCYTE ESTERASE,UA: ABNORMAL
SL AMB POCT BILIRUBIN,UA: ABNORMAL
SL AMB POCT BLOOD,UA: ABNORMAL
SL AMB POCT CLARITY,UA: ABNORMAL
SL AMB POCT COLOR,UA: YELLOW
SL AMB POCT KETONES,UA: ABNORMAL
SL AMB POCT NITRITE,UA: ABNORMAL
SL AMB POCT PH,UA: 5
SL AMB POCT SPECIFIC GRAVITY,UA: 1.02
SL AMB POCT URINE PROTEIN: ABNORMAL
SL AMB POCT UROBILINOGEN: 0.2

## 2019-09-22 PROCEDURE — 81002 URINALYSIS NONAUTO W/O SCOPE: CPT | Performed by: PHYSICIAN ASSISTANT

## 2019-09-22 PROCEDURE — 99213 OFFICE O/P EST LOW 20 MIN: CPT | Performed by: PHYSICIAN ASSISTANT

## 2019-09-22 PROCEDURE — 87077 CULTURE AEROBIC IDENTIFY: CPT | Performed by: PHYSICIAN ASSISTANT

## 2019-09-22 PROCEDURE — S9088 SERVICES PROVIDED IN URGENT: HCPCS | Performed by: PHYSICIAN ASSISTANT

## 2019-09-22 PROCEDURE — 87186 SC STD MICRODIL/AGAR DIL: CPT | Performed by: PHYSICIAN ASSISTANT

## 2019-09-22 PROCEDURE — 87086 URINE CULTURE/COLONY COUNT: CPT | Performed by: PHYSICIAN ASSISTANT

## 2019-09-22 RX ORDER — NITROFURANTOIN 25; 75 MG/1; MG/1
100 CAPSULE ORAL 2 TIMES DAILY
Qty: 14 CAPSULE | Refills: 0 | Status: SHIPPED | OUTPATIENT
Start: 2019-09-22 | End: 2019-09-29

## 2019-09-22 NOTE — PATIENT INSTRUCTIONS
1  Increase fluids  2  Take Macrobid 100mg  Twice daily x 7 days  3 Motrin as needed for comfort  4   Follow up with PCP in 3-5 days if symptoms persist

## 2019-09-22 NOTE — PROGRESS NOTES
[facility]        NAME: Lucy Kam is a 75 y o  female  : 1955    MRN: [id number]  DATE: 2019  TIME: 10:52 AM    Assessment and Plan   Dysuria [R30 0]  1  Dysuria  POCT urine dip    Urine culture    nitrofurantoin (MACROBID) 100 mg capsule         Patient Instructions     1  Increase fluids  2  Take Macrobid 100mg  Twice daily x 7 days  3 Motrin as needed for comfort  4  Follow up with PCP in 3-5 days if symptoms persist     Chief Complaint     Chief Complaint   Patient presents with    Possible UTI     Dysuria, hematuria x 24 hours  History of Present Illness       Chapis Becker is a 24-year-old female presents with a 2 day history of increased urinary frequency, pain and burning with urination  Patient has had no fever but has reported lower abdominal discomfort and visible blood in her urine  Review of Systems   Review of Systems   Constitutional: Negative  Gastrointestinal: Positive for abdominal pain  Genitourinary: Positive for dysuria, frequency and hematuria  Negative for flank pain           Current Medications       Current Outpatient Medications:     nitrofurantoin (MACROBID) 100 mg capsule, Take 1 capsule (100 mg total) by mouth 2 (two) times a day for 7 days, Disp: 14 capsule, Rfl: 0    triamcinolone (KENALOG) 0 1 % cream, Apply topically 2 (two) times a day (Patient not taking: Reported on 2018 ), Disp: 30 g, Rfl: 0    Current Allergies     Allergies as of 2019 - Reviewed 2019   Allergen Reaction Noted    Bee venom Anaphylaxis 2018    Influenza vaccines Rash 2018            The following portions of the patient's history were reviewed and updated as appropriate: allergies, current medications, past family history, past medical history, past social history, past surgical history and problem list      Past Medical History:   Diagnosis Date    Known health problems: none     Urinary tract infection        Past Surgical History:   Procedure Laterality Date    NO PAST SURGERIES         History reviewed  No pertinent family history  Medications have been verified  Objective   /86   Pulse 80   Temp 99 9 °F (37 7 °C) (Temporal)   Resp 18   Ht 5' 8" (1 727 m)   Wt 86 kg (189 lb 9 6 oz)   SpO2 98%   BMI 28 83 kg/m²        Physical Exam     Physical Exam   Constitutional: She appears well-developed and well-nourished  No distress  Cardiovascular: Normal rate, regular rhythm and normal heart sounds  No murmur heard  Pulmonary/Chest: Effort normal and breath sounds normal    Abdominal: Soft  Normal appearance and bowel sounds are normal  There is tenderness in the suprapubic area  There is no rigidity, no rebound, no guarding and no CVA tenderness

## 2019-09-24 LAB — BACTERIA UR CULT: ABNORMAL

## 2019-09-26 ENCOUNTER — TELEPHONE (OUTPATIENT)
Dept: URGENT CARE | Facility: MEDICAL CENTER | Age: 64
End: 2019-09-26

## 2019-09-26 DIAGNOSIS — N39.0 URINARY TRACT INFECTION WITHOUT HEMATURIA, SITE UNSPECIFIED: Primary | ICD-10-CM

## 2019-09-26 RX ORDER — SULFAMETHOXAZOLE AND TRIMETHOPRIM 800; 160 MG/1; MG/1
1 TABLET ORAL EVERY 12 HOURS SCHEDULED
Qty: 14 TABLET | Refills: 0 | Status: SHIPPED | OUTPATIENT
Start: 2019-09-26 | End: 2019-10-03

## 2019-09-26 NOTE — TELEPHONE ENCOUNTER
Notified patient of positive urine culture, resistant to Macrobid  Patient was stretched to Bactrim DS 1 tablet twice daily for 7 days

## 2021-02-05 ENCOUNTER — TRANSCRIBE ORDERS (OUTPATIENT)
Dept: ADMINISTRATIVE | Facility: HOSPITAL | Age: 66
End: 2021-02-05

## 2021-02-05 DIAGNOSIS — E04.1 NONTOXIC SINGLE THYROID NODULE: ICD-10-CM

## 2021-02-05 DIAGNOSIS — E04.0 NONTOXIC DIFFUSE GOITER: Primary | ICD-10-CM

## 2021-02-13 DIAGNOSIS — Z23 ENCOUNTER FOR IMMUNIZATION: ICD-10-CM

## 2021-02-15 ENCOUNTER — IMMUNIZATIONS (OUTPATIENT)
Dept: FAMILY MEDICINE CLINIC | Facility: HOSPITAL | Age: 66
End: 2021-02-15

## 2021-02-15 DIAGNOSIS — Z23 ENCOUNTER FOR IMMUNIZATION: Primary | ICD-10-CM

## 2021-02-15 PROCEDURE — 91300 SARS-COV-2 / COVID-19 MRNA VACCINE (PFIZER-BIONTECH) 30 MCG: CPT

## 2021-02-15 PROCEDURE — 0001A SARS-COV-2 / COVID-19 MRNA VACCINE (PFIZER-BIONTECH) 30 MCG: CPT

## 2021-03-07 ENCOUNTER — IMMUNIZATIONS (OUTPATIENT)
Dept: FAMILY MEDICINE CLINIC | Facility: HOSPITAL | Age: 66
End: 2021-03-07

## 2021-03-07 DIAGNOSIS — Z23 ENCOUNTER FOR IMMUNIZATION: Primary | ICD-10-CM

## 2021-03-07 PROCEDURE — 0002A SARS-COV-2 / COVID-19 MRNA VACCINE (PFIZER-BIONTECH) 30 MCG: CPT

## 2021-03-07 PROCEDURE — 91300 SARS-COV-2 / COVID-19 MRNA VACCINE (PFIZER-BIONTECH) 30 MCG: CPT

## 2021-04-12 ENCOUNTER — HOSPITAL ENCOUNTER (OUTPATIENT)
Dept: RADIOLOGY | Facility: MEDICAL CENTER | Age: 66
Discharge: HOME | End: 2021-04-12
Payer: COMMERCIAL

## 2021-04-12 DIAGNOSIS — E04.1 NONTOXIC SINGLE THYROID NODULE: ICD-10-CM

## 2021-04-12 PROCEDURE — 76536 US EXAM OF HEAD AND NECK: CPT

## 2021-05-19 ENCOUNTER — OFFICE VISIT (OUTPATIENT)
Dept: URGENT CARE | Facility: MEDICAL CENTER | Age: 66
End: 2021-05-19
Payer: COMMERCIAL

## 2021-05-19 VITALS
TEMPERATURE: 98 F | SYSTOLIC BLOOD PRESSURE: 159 MMHG | DIASTOLIC BLOOD PRESSURE: 76 MMHG | WEIGHT: 189 LBS | OXYGEN SATURATION: 98 % | HEART RATE: 85 BPM | BODY MASS INDEX: 28.64 KG/M2 | RESPIRATION RATE: 18 BRPM | HEIGHT: 68 IN

## 2021-05-19 DIAGNOSIS — R30.0 DYSURIA: Primary | ICD-10-CM

## 2021-05-19 LAB
SL AMB  POCT GLUCOSE, UA: ABNORMAL
SL AMB LEUKOCYTE ESTERASE,UA: ABNORMAL
SL AMB POCT BILIRUBIN,UA: ABNORMAL
SL AMB POCT BLOOD,UA: ABNORMAL
SL AMB POCT CLARITY,UA: ABNORMAL
SL AMB POCT COLOR,UA: YELLOW
SL AMB POCT KETONES,UA: ABNORMAL
SL AMB POCT NITRITE,UA: ABNORMAL
SL AMB POCT PH,UA: 6
SL AMB POCT SPECIFIC GRAVITY,UA: 1.02
SL AMB POCT URINE PROTEIN: ABNORMAL
SL AMB POCT UROBILINOGEN: 0.2

## 2021-05-19 PROCEDURE — 99213 OFFICE O/P EST LOW 20 MIN: CPT | Performed by: PHYSICIAN ASSISTANT

## 2021-05-19 PROCEDURE — S9088 SERVICES PROVIDED IN URGENT: HCPCS | Performed by: PHYSICIAN ASSISTANT

## 2021-05-19 PROCEDURE — 81002 URINALYSIS NONAUTO W/O SCOPE: CPT | Performed by: PHYSICIAN ASSISTANT

## 2021-05-19 RX ORDER — CIPROFLOXACIN 500 MG/1
500 TABLET, FILM COATED ORAL EVERY 12 HOURS SCHEDULED
Qty: 14 TABLET | Refills: 0 | Status: SHIPPED | OUTPATIENT
Start: 2021-05-19 | End: 2021-05-26

## 2021-05-19 NOTE — PATIENT INSTRUCTIONS
Dysuria  cipro twice daily  Follow up with PCP in 3-5 days  Proceed to  ER if symptoms worsen  Dysuria   WHAT YOU NEED TO KNOW:   Dysuria is difficulty urinating, or pain, burning, or discomfort with urination  Dysuria is usually a symptom of another problem  DISCHARGE INSTRUCTIONS:   Return to the emergency department if:   · You have severe back, side, or abdominal pain  · You have fever and shaking chills  · You vomit several times in a row  Contact your healthcare provider if:   · Your symptoms do not go away, even after treatment  · You have questions or concerns about your condition or care  Medicines:   · Medicines  may be given to help treat a bacterial infection or help decrease bladder spasms  · Take your medicine as directed  Contact your healthcare provider if you think your medicine is not helping or if you have side effects  Tell him of her if you are allergic to any medicine  Keep a list of the medicines, vitamins, and herbs you take  Include the amounts, and when and why you take them  Bring the list or the pill bottles to follow-up visits  Carry your medicine list with you in case of an emergency  Follow up with your healthcare provider as directed: Your healthcare provider may also refer you to a urologist or nephrologist to have additional testing  Write down your questions so you remember to ask them during your visits  Manage your dysuria:   · Drink more liquids  Liquids help flush out bacteria that may be causing an infection  Ask your healthcare provider how much liquid to drink each day and which liquids are best for you  · Take sitz baths as directed  Fill a bathtub with 4 to 6 inches of warm water  You may also use a sitz bath pan that fits over a toilet  Sit in the sitz bath for 20 minutes  Do this 2 to 3 times a day, or as directed  The warm water can help decrease pain and swelling       © Copyright [employer] 2020 Information is for End User's use only and may not be sold, redistributed or otherwise used for commercial purposes  All illustrations and images included in CareNotes® are the copyrighted property of [employer] M [employer]  or [employer]  The above information is an  only  It is not intended as medical advice for individual conditions or treatments  Talk to your doctor, nurse or pharmacist before following any medical regimen to see if it is safe and effective for you

## 2021-05-19 NOTE — PROGRESS NOTES
[facility]        NAME: Amber Belcher is a 74 y o  female  : 1955    MRN: [id number]  DATE: May 19, 2021  TIME: 9:26 AM    Assessment and Plan   Dysuria [R30 0]  1  Dysuria  POCT urine dip         Patient Instructions     Dysuria  cipro twice daily  Follow up with PCP in 3-5 days  Proceed to  ER if symptoms worsen  Chief Complaint     Chief Complaint   Patient presents with    Possible UTI     started yesterday with burning and freq  History of Present Illness       75 y/o female presents c/o frequency, urgency and dysuria x 3 days  Denies fever, chills, abdominal pain      Review of Systems   Review of Systems   Constitutional: Negative  HENT: Negative  Eyes: Negative  Respiratory: Negative  Negative for cough, chest tightness, shortness of breath, wheezing and stridor  Cardiovascular: Negative  Negative for chest pain, palpitations and leg swelling  Gastrointestinal: Negative  Genitourinary: Positive for dysuria, frequency and urgency  Negative for decreased urine volume, difficulty urinating, dyspareunia, enuresis, flank pain, genital sores, hematuria, menstrual problem, pelvic pain, vaginal bleeding, vaginal discharge and vaginal pain           Current Medications       Current Outpatient Medications:     triamcinolone (KENALOG) 0 1 % cream, Apply topically 2 (two) times a day (Patient not taking: Reported on 2018 ), Disp: 30 g, Rfl: 0    Current Allergies     Allergies as of 2021 - Reviewed 2019   Allergen Reaction Noted    Bee venom Anaphylaxis 2018    Influenza vaccines Rash 2018            The following portions of the patient's history were reviewed and updated as appropriate: allergies, current medications, past family history, past medical history, past social history, past surgical history and problem list      Past Medical History:   Diagnosis Date    Known health problems: none     Urinary tract infection        Past Surgical History:   Procedure Laterality Date    NO PAST SURGERIES         History reviewed  No pertinent family history  Medications have been verified  Objective   /76   Pulse 85   Temp 98 °F (36 7 °C)   Resp 18   Ht 5' 8" (1 727 m)   Wt 85 7 kg (189 lb)   SpO2 98%   BMI 28 74 kg/m²        Physical Exam     Physical Exam  Constitutional:       Appearance: She is well-developed  HENT:      Head: Normocephalic and atraumatic  Right Ear: External ear normal       Left Ear: External ear normal       Nose: Nose normal       Mouth/Throat:      Pharynx: No oropharyngeal exudate  Neck:      Musculoskeletal: Normal range of motion and neck supple  Cardiovascular:      Rate and Rhythm: Normal rate and regular rhythm  Heart sounds: Normal heart sounds  Pulmonary:      Effort: Pulmonary effort is normal  No respiratory distress  Breath sounds: Normal breath sounds  No wheezing or rales  Chest:      Chest wall: No tenderness  Abdominal:      General: Bowel sounds are normal  There is no distension  Palpations: Abdomen is soft  There is no mass  Tenderness: There is no abdominal tenderness  There is no guarding or rebound  Lymphadenopathy:      Cervical: No cervical adenopathy

## 2021-10-21 ENCOUNTER — OFFICE VISIT (OUTPATIENT)
Dept: URGENT CARE | Facility: MEDICAL CENTER | Age: 66
End: 2021-10-21
Payer: COMMERCIAL

## 2021-10-21 VITALS
OXYGEN SATURATION: 97 % | TEMPERATURE: 96.2 F | DIASTOLIC BLOOD PRESSURE: 95 MMHG | RESPIRATION RATE: 18 BRPM | BODY MASS INDEX: 28.04 KG/M2 | HEIGHT: 68 IN | SYSTOLIC BLOOD PRESSURE: 197 MMHG | WEIGHT: 185 LBS | HEART RATE: 95 BPM

## 2021-10-21 DIAGNOSIS — R35.0 FREQUENCY OF MICTURITION: Primary | ICD-10-CM

## 2021-10-21 LAB
SL AMB  POCT GLUCOSE, UA: ABNORMAL
SL AMB LEUKOCYTE ESTERASE,UA: ABNORMAL
SL AMB POCT BILIRUBIN,UA: ABNORMAL
SL AMB POCT BLOOD,UA: ABNORMAL
SL AMB POCT CLARITY,UA: CLEAR
SL AMB POCT COLOR,UA: YELLOW
SL AMB POCT KETONES,UA: ABNORMAL
SL AMB POCT NITRITE,UA: ABNORMAL
SL AMB POCT PH,UA: 6
SL AMB POCT SPECIFIC GRAVITY,UA: 1030
SL AMB POCT URINE PROTEIN: 30
SL AMB POCT UROBILINOGEN: 0.2

## 2021-10-21 PROCEDURE — 87186 SC STD MICRODIL/AGAR DIL: CPT | Performed by: PHYSICIAN ASSISTANT

## 2021-10-21 PROCEDURE — 99213 OFFICE O/P EST LOW 20 MIN: CPT | Performed by: PHYSICIAN ASSISTANT

## 2021-10-21 PROCEDURE — 81002 URINALYSIS NONAUTO W/O SCOPE: CPT | Performed by: PHYSICIAN ASSISTANT

## 2021-10-21 PROCEDURE — 87077 CULTURE AEROBIC IDENTIFY: CPT | Performed by: PHYSICIAN ASSISTANT

## 2021-10-21 PROCEDURE — S9088 SERVICES PROVIDED IN URGENT: HCPCS | Performed by: PHYSICIAN ASSISTANT

## 2021-10-21 PROCEDURE — 87086 URINE CULTURE/COLONY COUNT: CPT | Performed by: PHYSICIAN ASSISTANT

## 2021-10-21 RX ORDER — CIPROFLOXACIN 500 MG/1
500 TABLET, FILM COATED ORAL EVERY 12 HOURS SCHEDULED
Qty: 14 TABLET | Refills: 0 | Status: SHIPPED | OUTPATIENT
Start: 2021-10-21 | End: 2021-10-28

## 2021-10-23 LAB — BACTERIA UR CULT: ABNORMAL

## 2022-05-16 ENCOUNTER — VBI (OUTPATIENT)
Dept: ADMINISTRATIVE | Facility: OTHER | Age: 67
End: 2022-05-16

## 2024-07-24 ENCOUNTER — OFFICE VISIT (OUTPATIENT)
Dept: URGENT CARE | Facility: MEDICAL CENTER | Age: 69
End: 2024-07-24
Payer: COMMERCIAL

## 2024-07-24 VITALS
OXYGEN SATURATION: 96 % | SYSTOLIC BLOOD PRESSURE: 158 MMHG | BODY MASS INDEX: 29.04 KG/M2 | HEART RATE: 88 BPM | WEIGHT: 191 LBS | TEMPERATURE: 98.2 F | DIASTOLIC BLOOD PRESSURE: 86 MMHG | RESPIRATION RATE: 16 BRPM

## 2024-07-24 DIAGNOSIS — L08.9 LOCALIZED INFECTION OF SKIN: ICD-10-CM

## 2024-07-24 DIAGNOSIS — L24.7 IRRITANT CONTACT DERMATITIS DUE TO PLANTS, EXCEPT FOOD: Primary | ICD-10-CM

## 2024-07-24 PROBLEM — J06.9 ACUTE UPPER RESPIRATORY INFECTION: Status: ACTIVE | Noted: 2017-08-27

## 2024-07-24 PROBLEM — S60.222A CONTUSION OF HAND, LEFT: Status: ACTIVE | Noted: 2017-10-09

## 2024-07-24 PROBLEM — S62.327A CLOSED DISPLACED FRACTURE OF SHAFT OF FIFTH METACARPAL BONE OF LEFT HAND: Status: ACTIVE | Noted: 2017-10-09

## 2024-07-24 PROCEDURE — S9088 SERVICES PROVIDED IN URGENT: HCPCS

## 2024-07-24 PROCEDURE — 99213 OFFICE O/P EST LOW 20 MIN: CPT

## 2024-07-24 RX ORDER — CEPHALEXIN 500 MG/1
500 CAPSULE ORAL EVERY 8 HOURS SCHEDULED
Qty: 21 CAPSULE | Refills: 0 | Status: SHIPPED | OUTPATIENT
Start: 2024-07-24 | End: 2024-07-31

## 2024-07-24 RX ORDER — MELOXICAM 7.5 MG/1
7.5 TABLET ORAL DAILY
COMMUNITY
Start: 2024-07-01

## 2024-07-24 RX ORDER — METHYLPREDNISOLONE 4 MG/1
TABLET ORAL
Qty: 1 EACH | Refills: 0 | Status: SHIPPED | OUTPATIENT
Start: 2024-07-24

## 2024-07-24 RX ORDER — TRIAMCINOLONE ACETONIDE 5 MG/G
CREAM TOPICAL 2 TIMES DAILY
Qty: 15 G | Refills: 1 | Status: SHIPPED | OUTPATIENT
Start: 2024-07-24

## 2024-07-24 NOTE — PATIENT INSTRUCTIONS
Take antibiotics as prescribed.   Take entire course of antibiotics.     Eat yogurt with live and active cultures and/or take a probiotic at least 3 hours before or after antibiotic dose.   Monitor stool for diarrhea and/or blood. If this occurs, contact primary care doctor ASAP.     Take steroids as prescribed.   Recommend to take them with food  Do not take Ibuprofen while on steroids.   May take Acetaminophen for pain.     Recommend applying over-the-counter hydrocortisone cream to affected areas or topical prescribed Kenalog cream to intact skin, do not use over open skin or near eyes  Good hand hygiene  Avoid scratching area  Keep area clean and dry  Cool compresses  OTC non-drowsy antihistamine during the day such as Zyrtec OR Allegra OR Claritin  Oral benadryl for itching as needed at night, it can make you drowsy    Watch for increased signs of infection    If you develop any increased redness, rash is spreading, facial swelling, shortness of breath, difficulty breathing, fever, any new or concerning symptoms please proceed ER.    Follow up with PCP in 3-5 days.  Proceed to ER if symptoms worsen.    If tests are performed, our office will contact you with results only if changes need to made to the care plan discussed with you at the visit. You can review your full results on [facility].

## 2024-07-24 NOTE — PROGRESS NOTES
[facility]        NAME: Candace Nair is a 69 y.o. female  : 1955    MRN: [id number]  DATE: 2024  TIME: 10:12 PM    Assessment and Plan   Irritant contact dermatitis due to plants, except food [L24.7]  1. Irritant contact dermatitis due to plants, except food  methylPREDNISolone 4 MG tablet therapy pack    triamcinolone (KENALOG) 0.5 % cream      2. Localized infection of skin  cephalexin (KEFLEX) 500 mg capsule            Patient Instructions   Take antibiotics as prescribed.   Take entire course of antibiotics.     Eat yogurt with live and active cultures and/or take a probiotic at least 3 hours before or after antibiotic dose.   Monitor stool for diarrhea and/or blood. If this occurs, contact primary care doctor ASAP.     Take steroids as prescribed.   Recommend to take them with food  Do not take Ibuprofen while on steroids.   May take Acetaminophen for pain.     Recommend applying over-the-counter hydrocortisone cream to affected areas or topical prescribed Kenalog cream to intact skin, do not use over open skin or near eyes  Good hand hygiene  Avoid scratching area  Keep area clean and dry  Cool compresses  OTC non-drowsy antihistamine during the day such as Zyrtec OR Allegra OR Claritin  Oral benadryl for itching as needed at night, it can make you drowsy    Watch for increased signs of infection    If you develop any increased redness, rash is spreading, facial swelling, shortness of breath, difficulty breathing, fever, any new or concerning symptoms please proceed ER.    Follow up with PCP in 3-5 days.  Proceed to ER if symptoms worsen.    If tests are performed, our office will contact you with results only if changes need to made to the care plan discussed with you at the visit. You can review your full results on [facility].    Chief Complaint     Chief Complaint   Patient presents with    Rash     Pt states she was poked by morgan peña right arm. Applying  hydrocortisone. Spread to chest and abd. Very itchy.      History of Present Illness       Rash  This is a new problem. The current episode started in the past 7 days (Pt reports she was weeding around her [place], when she came into contact with plant material causing her to become itchy. Pt reports being poked several times by throrns from the nancy bush and found a thorn embedded into her R FA, which pt removed.). The problem has been gradually worsening since onset. The affected locations include the chest, abdomen, groin, left arm and right arm. The problem is mild. The rash is characterized by draining, itchiness, pain, redness and swelling. She was exposed to poison ivy/oak (Possible). The rash first occurred outside. Associated symptoms include itching. Pertinent negatives include no congestion, cough, diarrhea, fever, rhinorrhea, shortness of breath, sore throat or vomiting. Past treatments include anti-itch cream (Topical hydrocortisone). The treatment provided mild relief.       Review of Systems   Review of Systems   Constitutional: Negative.  Negative for chills, diaphoresis and fever.   HENT: Negative.  Negative for congestion, ear discharge, ear pain, postnasal drip, rhinorrhea, sinus pressure, sinus pain and sore throat.    Respiratory: Negative.  Negative for cough, shortness of breath and wheezing.    Cardiovascular: Negative.  Negative for chest pain and palpitations.   Gastrointestinal:  Negative for abdominal pain, constipation, diarrhea, nausea and vomiting.   Musculoskeletal:  Negative for myalgias.   Skin:  Positive for color change (redness), itching, rash and wound (R FA-pt reports she has a nancy bush thorn stuck in her skin that she pulled out, since then area has been oozing yellow clear fluid).   Neurological:  Negative for headaches.     Current Medications       Current Outpatient Medications:     cephalexin (KEFLEX) 500 mg capsule, Take 1 capsule (500 mg total) by mouth every 8  (eight) hours for 7 days, Disp: 21 capsule, Rfl: 0    meloxicam (MOBIC) 7.5 mg tablet, Take 7.5 mg by mouth daily, Disp: , Rfl:     methylPREDNISolone 4 MG tablet therapy pack, Use as directed on package, Disp: 1 each, Rfl: 0    triamcinolone (KENALOG) 0.5 % cream, Apply topically 2 (two) times a day, Disp: 15 g, Rfl: 1    Current Allergies     Allergies as of 07/24/2024 - Reviewed 07/24/2024   Allergen Reaction Noted    Bee venom Anaphylaxis 03/11/2018    Influenza vaccines Rash 03/11/2018            The following portions of the patient's history were reviewed and updated as appropriate: allergies, current medications, past family history, past medical history, past social history, past surgical history and problem list.     Past Medical History:   Diagnosis Date    Arthritis     Known health problems: none     Urinary tract infection        Past Surgical History:   Procedure Laterality Date    NO PAST SURGERIES         History reviewed. No pertinent family history.      Medications have been verified.        Objective   /86   Pulse 88   Temp 98.2 °F (36.8 °C)   Resp 16   Wt 86.6 kg (191 lb)   SpO2 96%   BMI 29.04 kg/m²        Physical Exam     Physical Exam  Vitals and nursing note reviewed.   Constitutional:       General: She is not in acute distress.     Appearance: Normal appearance. She is not ill-appearing, toxic-appearing or diaphoretic.   HENT:      Head: Normocephalic.   Cardiovascular:      Rate and Rhythm: Normal rate and regular rhythm.      Pulses: Normal pulses.      Heart sounds: Normal heart sounds. No murmur heard.  Pulmonary:      Effort: Pulmonary effort is normal. No respiratory distress.      Breath sounds: Normal breath sounds. No stridor. No wheezing, rhonchi or rales.   Chest:      Chest wall: No tenderness.   Musculoskeletal:         General: Normal range of motion.   Skin:     General: Skin is warm.      Findings: Erythema and rash present. No ecchymosis. Rash is crusting,  macular, papular and vesicular. Rash is not pustular or urticarial.             Comments: Scattered maculopapular rash noted over chest, abdomen, BL UE and groin, with erythema, small clustered of fluid filled vesicles noted   Neurological:      Mental Status: She is alert.

## 2024-08-23 PROBLEM — J06.9 ACUTE UPPER RESPIRATORY INFECTION: Status: RESOLVED | Noted: 2017-08-27 | Resolved: 2024-08-23

## 2024-10-10 ENCOUNTER — OFFICE VISIT (OUTPATIENT)
Dept: URGENT CARE | Facility: CLINIC | Age: 69
End: 2024-10-10
Payer: COMMERCIAL

## 2024-10-10 VITALS
DIASTOLIC BLOOD PRESSURE: 90 MMHG | RESPIRATION RATE: 16 BRPM | OXYGEN SATURATION: 97 % | HEIGHT: 68 IN | BODY MASS INDEX: 28.95 KG/M2 | WEIGHT: 191 LBS | TEMPERATURE: 98.6 F | HEART RATE: 92 BPM | SYSTOLIC BLOOD PRESSURE: 178 MMHG

## 2024-10-10 DIAGNOSIS — N76.4 VULVAR ABSCESS: ICD-10-CM

## 2024-10-10 DIAGNOSIS — N76.2 CELLULITIS OF LABIA MAJORA: Primary | ICD-10-CM

## 2024-10-10 PROCEDURE — S9088 SERVICES PROVIDED IN URGENT: HCPCS | Performed by: NURSE PRACTITIONER

## 2024-10-10 PROCEDURE — 99214 OFFICE O/P EST MOD 30 MIN: CPT | Performed by: NURSE PRACTITIONER

## 2024-10-10 PROCEDURE — 96372 THER/PROPH/DIAG INJ SC/IM: CPT | Performed by: NURSE PRACTITIONER

## 2024-10-10 RX ORDER — CEFTRIAXONE 1 G/1
1000 INJECTION, POWDER, FOR SOLUTION INTRAMUSCULAR; INTRAVENOUS ONCE
Status: COMPLETED | OUTPATIENT
Start: 2024-10-10 | End: 2024-10-10

## 2024-10-10 RX ORDER — SULFAMETHOXAZOLE/TRIMETHOPRIM 800-160 MG
1 TABLET ORAL EVERY 12 HOURS SCHEDULED
Qty: 14 TABLET | Refills: 0 | Status: SHIPPED | OUTPATIENT
Start: 2024-10-10 | End: 2024-10-17

## 2024-10-10 RX ADMIN — CEFTRIAXONE 1000 MG: 1 INJECTION, POWDER, FOR SOLUTION INTRAMUSCULAR; INTRAVENOUS at 10:11

## 2024-10-10 NOTE — PROGRESS NOTES
"  [facility]        NAME: Candace Nair is a 69 y.o. female  : 1955    MRN: [id number]  DATE: October 10, 2024  TIME: 10:19 AM    Assessment and Plan   Cellulitis of labia majora [N76.2]  1. Cellulitis of labia majora        2. Vulvar abscess  amoxicillin-clavulanate (AUGMENTIN) 875-125 mg per tablet    sulfamethoxazole-trimethoprim (BACTRIM DS) 800-160 mg per tablet    cefTRIAXone (ROCEPHIN) injection 1,000 mg    Ambulatory referral to Obstetrics / Gynecology        --Possible early vulvar abscess, but no immediate need for I&D noted. Address per below.      Patient Instructions     Patient Instructions   --Injectable antibiotic given. Starting this evening, begin oral antibiotics and take as directed  --Warm compresses and/or warm water sitz baths three times a day  --Motrin or Tylenol as needed  --Follow-up with GYN if no improvement in the next 24-72 hours.  Go to ER if worse. :    If tests have been performed at [facility], our office will contact you with results if changes need to be made to the care plan discussed with you at the visit.  You can review your full results on [facility].    Chief Complaint     Chief Complaint   Patient presents with    Wound Infection     Got caught in a [employer] plant and spread all over body. Says she had spread to private area and she shaved and today and she says pus came out of her area.          History of Present Illness       Here with complaints of vulvar pain, redness, swelling x 2-3 days.  Small amount of white drainage noted this morning.    No fever.   No home, topical treatments.    Had episode of \"sporotrichosis\" two months ago from getting pricked in multiple areas from [place].  Associated redness, itching, mild tenderness of arms, stomach, chest.  States she went to urgent care and was treated with oral/topical steroid and antibiotic. States lesions have since cleared up. Wonders if current infection may be related.   Mild external " "dysuria.  No  symptoms otherwise.    Denies hx of MRSA. Denies PCN allergy.   Does not currently have GYN.          Review of Systems   Review of Systems   Constitutional:  Negative for fever.   Gastrointestinal:  Negative for abdominal pain.   Genitourinary:  Positive for dysuria and vaginal pain.         Current Medications       Current Outpatient Medications:     amoxicillin-clavulanate (AUGMENTIN) 875-125 mg per tablet, Take 1 tablet by mouth every 12 (twelve) hours for 7 days, Disp: 14 tablet, Rfl: 0    meloxicam (MOBIC) 7.5 mg tablet, Take 7.5 mg by mouth daily, Disp: , Rfl:     methylPREDNISolone 4 MG tablet therapy pack, Use as directed on package, Disp: 1 each, Rfl: 0    sulfamethoxazole-trimethoprim (BACTRIM DS) 800-160 mg per tablet, Take 1 tablet by mouth every 12 (twelve) hours for 7 days, Disp: 14 tablet, Rfl: 0    triamcinolone (KENALOG) 0.5 % cream, Apply topically 2 (two) times a day, Disp: 15 g, Rfl: 1    Current Facility-Administered Medications:     cefTRIAXone (ROCEPHIN) injection 1,000 mg, 1,000 mg, Intramuscular, Once,     Current Allergies     Allergies as of 10/10/2024 - Reviewed 10/10/2024   Allergen Reaction Noted    Bee venom Anaphylaxis 03/11/2018    Influenza vaccines Rash 03/11/2018            The following portions of the patient's history were reviewed and updated as appropriate: allergies, current medications, past family history, past medical history, past social history, past surgical history and problem list.     Past Medical History:   Diagnosis Date    Arthritis     Known health problems: none     Urinary tract infection        Past Surgical History:   Procedure Laterality Date    NO PAST SURGERIES         No family history on file.      Medications have been verified.        Objective   BP (!) 178/90 (BP Location: Left arm, Patient Position: Sitting, Cuff Size: Standard)   Pulse 92   Temp 98.6 °F (37 °C) (Temporal)   Resp 16   Ht 5' 8\" (1.727 m)   Wt 86.6 kg (191 lb)  "  SpO2 97%   BMI 29.04 kg/m²   No LMP recorded. Patient is postmenopausal.       Physical Exam     Physical Exam  Genitourinary:     Vagina: No vaginal discharge.      Comments: Right labia majora and immediately surrounding soft tissue erythematous, tender, indurated with minimal underlying fluctuance.  No active drainage noted.   Musculoskeletal:         General: Tenderness present.   Skin:     Findings: Erythema present.   Neurological:      Mental Status: She is alert.